# Patient Record
Sex: FEMALE | Race: WHITE | Employment: STUDENT | URBAN - NONMETROPOLITAN AREA
[De-identification: names, ages, dates, MRNs, and addresses within clinical notes are randomized per-mention and may not be internally consistent; named-entity substitution may affect disease eponyms.]

---
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
## 2023-01-18 ENCOUNTER — OFFICE VISIT (OUTPATIENT)
Dept: FAMILY MEDICINE CLINIC | Age: 18
End: 2023-01-18

## 2023-01-18 VITALS
HEART RATE: 118 BPM | RESPIRATION RATE: 18 BRPM | BODY MASS INDEX: 21.37 KG/M2 | DIASTOLIC BLOOD PRESSURE: 78 MMHG | WEIGHT: 141 LBS | SYSTOLIC BLOOD PRESSURE: 118 MMHG | HEIGHT: 68 IN

## 2023-01-18 DIAGNOSIS — R00.2 PALPITATIONS: ICD-10-CM

## 2023-01-18 DIAGNOSIS — Z00.00 WELL ADULT EXAM: Primary | ICD-10-CM

## 2023-01-18 DIAGNOSIS — L65.9 HAIR LOSS: ICD-10-CM

## 2023-01-18 DIAGNOSIS — R00.0 TACHYCARDIA: ICD-10-CM

## 2023-01-18 DIAGNOSIS — R51.9 NEW ONSET OF HEADACHES: ICD-10-CM

## 2023-01-18 DIAGNOSIS — Z83.3 FAMILY HISTORY OF DIABETES MELLITUS: ICD-10-CM

## 2023-01-18 DIAGNOSIS — R53.83 FATIGUE, UNSPECIFIED TYPE: ICD-10-CM

## 2023-01-18 LAB
BASOPHILS # BLD: 0.8 %
BASOPHILS ABSOLUTE: 0.1 THOU/MM3 (ref 0–0.1)
EOSINOPHIL # BLD: 0.8 %
EOSINOPHILS ABSOLUTE: 0.1 THOU/MM3 (ref 0–0.4)
ERYTHROCYTE [DISTWIDTH] IN BLOOD BY AUTOMATED COUNT: 13 % (ref 11.5–14.5)
ERYTHROCYTE [DISTWIDTH] IN BLOOD BY AUTOMATED COUNT: 44.1 FL (ref 35–45)
HCT VFR BLD CALC: 42.1 % (ref 37–47)
HEMOGLOBIN: 14.2 GM/DL (ref 12–16)
IMMATURE GRANS (ABS): 0.02 THOU/MM3 (ref 0–0.07)
IMMATURE GRANULOCYTES: 0.3 %
LYMPHOCYTES # BLD: 34 %
LYMPHOCYTES ABSOLUTE: 2.2 THOU/MM3 (ref 1–4.8)
MCH RBC QN AUTO: 31.5 PG (ref 26–33)
MCHC RBC AUTO-ENTMCNC: 33.7 GM/DL (ref 32.2–35.5)
MCV RBC AUTO: 93.3 FL (ref 81–99)
MONOCYTES # BLD: 8.3 %
MONOCYTES ABSOLUTE: 0.5 THOU/MM3 (ref 0.4–1.3)
NUCLEATED RED BLOOD CELLS: 0 /100 WBC
PLATELET # BLD: 267 THOU/MM3 (ref 130–400)
PMV BLD AUTO: 10.5 FL (ref 9.4–12.4)
RBC # BLD: 4.51 MILL/MM3 (ref 4.2–5.4)
SEG NEUTROPHILS: 55.8 %
SEGMENTED NEUTROPHILS ABSOLUTE COUNT: 3.7 THOU/MM3 (ref 1.8–7.7)
WBC # BLD: 6.6 THOU/MM3 (ref 4.8–10.8)

## 2023-01-18 RX ORDER — LEVONORGESTREL AND ETHINYL ESTRADIOL 0.1-0.02MG
KIT ORAL
COMMUNITY
Start: 2023-01-14

## 2023-01-18 SDOH — ECONOMIC STABILITY: FOOD INSECURITY: WITHIN THE PAST 12 MONTHS, YOU WORRIED THAT YOUR FOOD WOULD RUN OUT BEFORE YOU GOT MONEY TO BUY MORE.: NEVER TRUE

## 2023-01-18 SDOH — ECONOMIC STABILITY: FOOD INSECURITY: WITHIN THE PAST 12 MONTHS, THE FOOD YOU BOUGHT JUST DIDN'T LAST AND YOU DIDN'T HAVE MONEY TO GET MORE.: NEVER TRUE

## 2023-01-18 ASSESSMENT — ENCOUNTER SYMPTOMS
SHORTNESS OF BREATH: 0
COLOR CHANGE: 0
BACK PAIN: 0
ABDOMINAL PAIN: 0
DIARRHEA: 0
BLOOD IN STOOL: 0
COUGH: 0
ROS SKIN COMMENTS: HAIR LOSS
SINUS PRESSURE: 0
EYE ITCHING: 0
CONSTIPATION: 0
EYE DISCHARGE: 0
EYE REDNESS: 0
WHEEZING: 0
EYE PAIN: 0

## 2023-01-18 ASSESSMENT — PATIENT HEALTH QUESTIONNAIRE - PHQ9
SUM OF ALL RESPONSES TO PHQ9 QUESTIONS 1 & 2: 0
1. LITTLE INTEREST OR PLEASURE IN DOING THINGS: 0
SUM OF ALL RESPONSES TO PHQ QUESTIONS 1-9: 0
2. FEELING DOWN, DEPRESSED OR HOPELESS: 0
SUM OF ALL RESPONSES TO PHQ QUESTIONS 1-9: 0

## 2023-01-18 ASSESSMENT — SOCIAL DETERMINANTS OF HEALTH (SDOH): HOW HARD IS IT FOR YOU TO PAY FOR THE VERY BASICS LIKE FOOD, HOUSING, MEDICAL CARE, AND HEATING?: NOT HARD AT ALL

## 2023-01-18 NOTE — PROGRESS NOTES
Brianna Leon (:  2005) is a 21 y.o. female,Established patient, here for evaluation of the following chief complaint(s): Annual Exam (Concerned w hair loss, tachycardia, palpitations )         ASSESSMENT/PLAN:  [facility] was seen today for annual exam.    Diagnoses and all orders for this visit:      Tachycardia  - Chronic  - EKG revealed ST  - No identifiable cause at this time  - Avoid caffeine intake. Adequate hydration encouraged. - Further evaluate with blood work  - Consider cardiology referral or use of beta blocker if indicated. -     EKG 12 Lead  -     Ferritin; Future  -     Iron; Future  -     TSH with Reflex; Future  -     CBC with Auto Differential; Future  -     Basic Metabolic Panel; Future    Hair loss  - New  - Further evaluate with blood work  - Given diffuse nature of hair loss, fungal cause is not suspected  - Consider dermatology referral if indicated  -     Ferritin; Future  -     Iron; Future  -     TSH with Reflex; Future    Fatigue, unspecified type  -     Ferritin; Future  -     Iron; Future  -     TSH with Reflex; Future  -     CBC with Auto Differential; Future  -     Basic Metabolic Panel; Future    New onset of headaches  - New  - Given new presentation and family history of glioblastoma, mri ordered for further evaluation  - Adequate hydration and sleep encouraged  -     MRI BRAIN WO CONTRAST; Future    Palpitations  - Chronic  - EKG revealed ST  - No identifiable cause at this time  - Avoid caffeine intake. Adequate hydration encouraged. - Further evaluate with blood work  - Consider cardiology referral or use of beta blocker if indicated. -     EKG 12 Lead  -     Ferritin; Future  -     Iron; Future  -     TSH with Reflex; Future  -     CBC with Auto Differential; Future  -     Basic Metabolic Panel; Future    Family history of diabetes mellitus  -     Hemoglobin A1C; Future            Return in about 1 month (around 2023) for Headaches, palpitations . Subjective   SUBJECTIVE/OBJECTIVE:  Patient presents with her mother for evaluation of tachycardia, headaches, palpitations and hair loss. Headaches occurring once per week. Has one currently. Bilateral temples. Sharp, pounding. At their worst, pain is a 10/10. Tylenol, ibuprofen do not resolve pain. Typically resolve with sleep. No known triggers. Photosensitivity. Previously experienced headaches with her menstrual cycles but those resolved with oral birth control. Family history of glioblastoma (paternal grandmother). Generalized hair loss. Mom notices significant amount of hair in the patient's hair brush. Palpitations   This is a recurrent problem. The current episode started more than 1 year ago. The problem occurs intermittently. On average, each episode lasts 30 minutes. Nothing aggravates the symptoms. Associated symptoms include chest fullness. Associated symptoms comments: Hard to swallow, lightheadedness. Review of Systems  See additional note    Objective  Physical Exam  See additional note               An electronic signature was used to authenticate this note.     --EMMANUEL Singh - CNP

## 2023-01-18 NOTE — PROGRESS NOTES
[address] [address]  Dept: [phone]  Dept Fax: [phone]: [phone]     Visit Date:  1/18/2023    Patient:  Ferny Cali  YOB: 2005  Age: 21 y.o. Gender: female  BMI: Body mass index is 21.25 kg/m². Ferny Cali, Established patient, is being seen today for   Chief Complaint   Patient presents with    Annual Exam     Concerned w hair loss, tachycardia, palpitations    . Assessment/Plan      1. Well adult exam  - Age-appropriate education provided. - Health maintenance reviewed. - Encourage healthy diet and frequent aerobic exercise. Return in about 1 month (around 2/18/2023) for Headaches, palpitations . HPI:     Patient presents with her mother today for an annual physical examination. Health maintenance reviewed. Going to [facility] for animal studies. Sleeping 8-10 hours daily. Negative medical history. Only current medication is an an oral birth control. PHQ Scores 1/18/2023 3/22/2021 11/12/2019   PHQ2 Score 0 0 0   PHQ9 Score 0 0 0     Interpretation of Total Score Depression Severity: 1-4 = Minimal depression, 5-9 = Mild depression, 10-14 = Moderate depression, 15-19 = Moderately severe depression, 20-27 = Severe depression      Medications    Current Outpatient Medications:     BALCOLTRA 0.1-20 MG-MCG(21) TABS, , Disp: , Rfl:     The patient has No Known Allergies. Past Medical History  Shelbie Silva  has no past medical history on file. Past Surgical History  The patient  has no past surgical history on file. Family History  This patient's family history is not on file. Social History  Shelbie Silva  reports that she has never smoked. She has never used smokeless tobacco. She reports that she does not drink alcohol and does not use drugs. Health Maintenance:    Health maintenance reviewed.    Health Maintenance   Topic Date Due    COVID-19 Vaccine (1) Never done    HPV vaccine (1 - 2-dose series) Never done    HIV screen  Never done    Chlamydia/GC screen  Never done    Hepatitis C screen  Never done    Depression Screen  03/22/2022    Flu vaccine (1) Never done    DTaP/Tdap/Td vaccine (5 - Td or Tdap) 06/30/2025    Hepatitis A vaccine  Completed    Hib vaccine  Completed    Varicella vaccine  Completed    Meningococcal (ACWY) vaccine  Completed    Pneumococcal 0-64 years Vaccine  Aged Out       Subjective/Objective:      Review of Systems   Constitutional:  Positive for fatigue. Negative for chills and fever. HENT:  Negative for congestion, ear pain, sinus pressure and sneezing. Eyes:  Negative for pain, discharge, redness and itching. Respiratory:  Negative for cough, shortness of breath and wheezing. Cardiovascular:  Positive for palpitations. Negative for chest pain and leg swelling. Gastrointestinal:  Negative for abdominal pain, blood in stool, constipation and diarrhea. Endocrine: Negative for polydipsia, polyphagia and polyuria. Genitourinary:  Negative for difficulty urinating and hematuria. Musculoskeletal:  Negative for arthralgias, back pain and neck pain. Skin:  Negative for color change, pallor and rash. Hair loss   Allergic/Immunologic: Negative for environmental allergies and food allergies. Neurological:  Positive for headaches. Negative for dizziness, light-headedness and numbness. Psychiatric/Behavioral:  Negative for agitation, confusion and dysphoric mood. The patient is not nervous/anxious. /78   Pulse (!) 118   Resp 18   Ht 5' 8.3\" (1.735 m)   Wt 141 lb (64 kg)   LMP 01/04/2023 (Exact Date)   BMI 21.25 kg/m²     Physical Exam  Vitals and nursing note reviewed. Constitutional:       General: She is awake. Appearance: Normal appearance. HENT:      Head: Normocephalic and atraumatic.       Right Ear: Hearing and external ear normal.      Left Ear: Hearing and external ear normal.      Nose: Nose normal. No congestion or rhinorrhea. Eyes:      General: Lids are normal.         Right eye: No discharge. Left eye: No discharge. Conjunctiva/sclera: Conjunctivae normal.      Pupils: Pupils are equal, round, and reactive to light. Neck:      Trachea: No tracheal deviation. Cardiovascular:      Rate and Rhythm: Regular rhythm. Tachycardia present. Heart sounds: Normal heart sounds. No murmur heard. Pulmonary:      Effort: Pulmonary effort is normal. No respiratory distress. Breath sounds: No stridor. No wheezing. Musculoskeletal:      Cervical back: Full passive range of motion without pain. Skin:     General: Skin is dry. Coloration: Skin is not jaundiced or pale. Comments: Diffuse hair loss, thinning noted. Neurological:      General: No focal deficit present. Mental Status: She is alert. Mental status is at baseline. Motor: No weakness. Gait: Gait normal.   Psychiatric:         Mood and Affect: Mood and affect normal.         Behavior: Behavior is cooperative. Labs Reviewed 1/18/2023:    No results found for: WBC, HGB, HCT, PLT, CHOL, TRIG, HDL, LDLDIRECT, ALT, AST, NA, K, CL, CREATININE, BUN, CO2, TSH, PSA, INR, GLUF, LABA1C, LABMICR        Patient given educational materials - see patient instructions. Discussed use, benefit, and side effects of prescribed medications. All patient questions answered. Pt voiced understanding. Reviewed health maintenance.        Electronically signed by EMMANUEL Yeh CNP on 1/18/2023 at 3:46 PM EST

## 2023-01-19 ENCOUNTER — TELEPHONE (OUTPATIENT)
Dept: FAMILY MEDICINE CLINIC | Age: 18
End: 2023-01-19

## 2023-01-19 LAB
ANION GAP SERPL CALCULATED.3IONS-SCNC: 12 MEQ/L (ref 8–16)
AVERAGE GLUCOSE: 102 MG/DL (ref 70–126)
BUN BLDV-MCNC: 9 MG/DL (ref 7–22)
CALCIUM SERPL-MCNC: 9.3 MG/DL (ref 8.5–10.5)
CHLORIDE BLD-SCNC: 105 MEQ/L (ref 98–111)
CO2: 24 MEQ/L (ref 23–33)
CREAT SERPL-MCNC: 0.7 MG/DL (ref 0.4–1.2)
FERRITIN: 69 NG/ML (ref 10–291)
GFR SERPL CREATININE-BSD FRML MDRD: > 60 ML/MIN/1.73M2
GLUCOSE BLD-MCNC: 117 MG/DL (ref 70–108)
HBA1C MFR BLD: 5.4 % (ref 4.4–6.4)
IRON: 72 UG/DL (ref 50–170)
POTASSIUM SERPL-SCNC: 4.2 MEQ/L (ref 3.5–5.2)
SODIUM BLD-SCNC: 141 MEQ/L (ref 135–145)
TSH SERPL DL<=0.05 MIU/L-ACNC: 2.26 UIU/ML (ref 0.4–4.2)

## 2023-01-19 NOTE — TELEPHONE ENCOUNTER
----- Message from EMMANUEL Bernal CNP sent at 1/19/2023  8:53 AM EST -----  Blood work is normal. Glucose slightly elevated but d/t non-fasting state. A1c is normal. Will plan to continue with MRI.

## 2023-02-08 ENCOUNTER — HOSPITAL ENCOUNTER (OUTPATIENT)
Dept: MRI IMAGING | Age: 18
Discharge: HOME | End: 2023-02-08
Payer: COMMERCIAL

## 2023-02-08 DIAGNOSIS — R51.9 NEW ONSET OF HEADACHES: ICD-10-CM

## 2023-02-08 PROCEDURE — 70551 MRI BRAIN STEM W/O DYE: CPT

## 2023-02-09 ENCOUNTER — TELEPHONE (OUTPATIENT)
Dept: FAMILY MEDICINE CLINIC | Age: 18
End: 2023-02-09

## 2023-02-09 NOTE — TELEPHONE ENCOUNTER
----- Message from EMMANUEL Pratt CNP sent at 2/9/2023  9:21 AM EST -----  Mri of the brain is normal.

## 2023-03-01 ENCOUNTER — OFFICE VISIT (OUTPATIENT)
Dept: FAMILY MEDICINE CLINIC | Age: 18
End: 2023-03-01
Payer: COMMERCIAL

## 2023-03-01 VITALS
DIASTOLIC BLOOD PRESSURE: 72 MMHG | SYSTOLIC BLOOD PRESSURE: 122 MMHG | WEIGHT: 141 LBS | BODY MASS INDEX: 21.25 KG/M2 | HEART RATE: 89 BPM | OXYGEN SATURATION: 98 %

## 2023-03-01 DIAGNOSIS — G44.201 ACUTE INTRACTABLE TENSION-TYPE HEADACHE: Primary | ICD-10-CM

## 2023-03-01 PROCEDURE — 99214 OFFICE O/P EST MOD 30 MIN: CPT | Performed by: NURSE PRACTITIONER

## 2023-03-01 RX ORDER — SUMATRIPTAN 100 MG/1
100 TABLET, FILM COATED ORAL DAILY PRN
Qty: 9 TABLET | Refills: 2 | Status: SHIPPED | OUTPATIENT
Start: 2023-03-01

## 2023-03-01 RX ORDER — PROPRANOLOL HYDROCHLORIDE 20 MG/1
20 TABLET ORAL 2 TIMES DAILY
Qty: 180 TABLET | Refills: 0 | Status: SHIPPED | OUTPATIENT
Start: 2023-03-01 | End: 2023-05-30

## 2023-03-01 SDOH — ECONOMIC STABILITY: FOOD INSECURITY: WITHIN THE PAST 12 MONTHS, THE FOOD YOU BOUGHT JUST DIDN'T LAST AND YOU DIDN'T HAVE MONEY TO GET MORE.: NEVER TRUE

## 2023-03-01 SDOH — ECONOMIC STABILITY: HOUSING INSECURITY
IN THE LAST 12 MONTHS, WAS THERE A TIME WHEN YOU DID NOT HAVE A STEADY PLACE TO SLEEP OR SLEPT IN A SHELTER (INCLUDING NOW)?: NO

## 2023-03-01 SDOH — ECONOMIC STABILITY: FOOD INSECURITY: WITHIN THE PAST 12 MONTHS, YOU WORRIED THAT YOUR FOOD WOULD RUN OUT BEFORE YOU GOT MONEY TO BUY MORE.: NEVER TRUE

## 2023-03-01 SDOH — ECONOMIC STABILITY: INCOME INSECURITY: HOW HARD IS IT FOR YOU TO PAY FOR THE VERY BASICS LIKE FOOD, HOUSING, MEDICAL CARE, AND HEATING?: NOT HARD AT ALL

## 2023-03-01 NOTE — PROGRESS NOTES
Jd Pickett (:  2005) is a 21 y.o. female,Established patient, here for evaluation of the following chief complaint(s):  Follow-up (HA not better, has not had palpitation since last visit )         ASSESSMENT/PLAN:  1. Acute intractable tension-type headache  - Chronic, uncontrolled  - Start prophylactic treatment with propranolol, abortive treatment with sumatriptan  - May benefit from OCP adjustment per OBGYN given previous improvements in symptoms with contraception initiation  - Encouraged completion of headache diary  -     propranolol (INDERAL) 20 MG tablet; Take 1 tablet by mouth 2 times daily, Disp-180 tablet, R-0Normal  -     SUMAtriptan (IMITREX) 100 MG tablet; Take 1 tablet by mouth daily as needed for Migraine (May repeat dose after 2 hours if migraine continues. Max 2 doses per day), Disp-9 tablet, R-2Normal    Return if symptoms worsen or fail to improve. Subjective   SUBJECTIVE/OBJECTIVE:  Patient presents with his mother for a follow up of headaches and palpitations. Has not had reoccurrence of palpitations since our last visit. Headahces persist. 2-3 weekly. Pain is a 2-3/10. Some more severe headaches as well. Bilateral temples. Sharp, pounding. At their worst, pain is a 10/10. Tylenol, ibuprofen do not resolve pain. Typically resolve with sleep. No known triggers. Photosensitivity. Interested in starting medication to prevent and treat headaches. Review of Systems   Constitutional:  Negative for fever. Eyes:  Positive for photophobia. Cardiovascular:  Negative for chest pain and palpitations. Neurological:  Positive for headaches. Negative for weakness. Objective   Physical Exam  Vitals and nursing note reviewed. Constitutional:       General: She is awake. Appearance: Normal appearance. HENT:      Head: Normocephalic and atraumatic.       Right Ear: Hearing and external ear normal.      Left Ear: Hearing and external ear normal.      Nose: Nose normal. No congestion or rhinorrhea. Eyes:      General: Lids are normal.         Right eye: No discharge. Left eye: No discharge. Conjunctiva/sclera: Conjunctivae normal.   Neck:      Trachea: No tracheal deviation. Cardiovascular:      Rate and Rhythm: Normal rate and regular rhythm. Heart sounds: Normal heart sounds. No murmur heard. Pulmonary:      Effort: Pulmonary effort is normal. No respiratory distress. Breath sounds: No stridor. No wheezing. Musculoskeletal:      Cervical back: Full passive range of motion without pain. Skin:     General: Skin is dry. Coloration: Skin is not jaundiced or pale. Neurological:      General: No focal deficit present. Mental Status: She is alert. Mental status is at baseline. Psychiatric:         Mood and Affect: Mood and affect normal.         Behavior: Behavior is cooperative. An electronic signature was used to authenticate this note.     --EMMANUEL Farrell - CNP

## 2023-03-02 ASSESSMENT — ENCOUNTER SYMPTOMS: PHOTOPHOBIA: 1

## 2023-04-26 ENCOUNTER — TELEPHONE (OUTPATIENT)
Dept: FAMILY MEDICINE CLINIC | Age: 18
End: 2023-04-26

## 2023-04-26 NOTE — TELEPHONE ENCOUNTER
Pt called wanting to know when her last tetanus shot was, notified pt and voiced understanding. She then asked for a copy. I printed the copy and put it at the  in the red folder for her to . She said she will come tomorrow 4.27.23 to .

## 2023-05-28 DIAGNOSIS — G44.201 ACUTE INTRACTABLE TENSION-TYPE HEADACHE: ICD-10-CM

## 2023-05-30 RX ORDER — PROPRANOLOL HYDROCHLORIDE 20 MG/1
20 TABLET ORAL 2 TIMES DAILY
Qty: 180 TABLET | Refills: 2 | Status: SHIPPED | OUTPATIENT
Start: 2023-05-30 | End: 2024-02-24

## 2023-05-31 DIAGNOSIS — G44.201 ACUTE INTRACTABLE TENSION-TYPE HEADACHE: ICD-10-CM

## 2023-05-31 RX ORDER — SUMATRIPTAN 100 MG/1
100 TABLET, FILM COATED ORAL DAILY PRN
Qty: 9 TABLET | Refills: 2 | Status: SHIPPED | OUTPATIENT
Start: 2023-05-31

## 2023-05-31 NOTE — TELEPHONE ENCOUNTER
Prosper Mccullough called requesting a refill on the following medications:  Requested Prescriptions     Pending Prescriptions Disp Refills    SUMAtriptan (IMITREX) 100 MG tablet [Pharmacy Med Name: SUMATRIPTAN SUCC 100 MG TABLET] 9 tablet 2     Sig: TAKE 1 TABLET BY MOUTH DAILY AS NEEDED FOR MIGRAINE (MAY REPEAT DOSE AFTER 2 HOURS IF MIGRAINE CONTINUES.  MAX 2 DOSES PER DAY)       Date of last visit: 3/1/2023  Date of next visit (if applicable):Visit date not found  Date of last refill: 3/1/2023 9 with 2 refills  Pharmacy Name: [facility], [address] [facility]

## 2023-10-25 ENCOUNTER — TELEMEDICINE (OUTPATIENT)
Dept: FAMILY MEDICINE CLINIC | Age: 18
End: 2023-10-25
Payer: COMMERCIAL

## 2023-10-25 DIAGNOSIS — J02.9 ACUTE PHARYNGITIS, UNSPECIFIED ETIOLOGY: Primary | ICD-10-CM

## 2023-10-25 PROCEDURE — 99213 OFFICE O/P EST LOW 20 MIN: CPT | Performed by: NURSE PRACTITIONER

## 2023-10-25 RX ORDER — AMOXICILLIN 500 MG/1
500 TABLET, FILM COATED ORAL 2 TIMES DAILY
Qty: 20 TABLET | Refills: 0 | Status: SHIPPED | OUTPATIENT
Start: 2023-10-25 | End: 2023-11-04

## 2023-10-25 RX ORDER — PROPRANOLOL HYDROCHLORIDE 20 MG/1
20 TABLET ORAL 2 TIMES DAILY
Qty: 180 TABLET | Refills: 1 | Status: SHIPPED | OUTPATIENT
Start: 2023-10-25 | End: 2024-04-22

## 2023-10-25 ASSESSMENT — ENCOUNTER SYMPTOMS
SORE THROAT: 1
VOMITING: 0
SINUS PRESSURE: 0
SWOLLEN GLANDS: 1
NAUSEA: 0

## 2023-10-25 NOTE — PROGRESS NOTES
Marcos Rabago, was evaluated through a synchronous (real-time) audio-video encounter. The patient (or guardian if applicable) is aware that this is a billable service, which includes applicable co-pays. This Virtual Visit was conducted with patient's (and/or legal guardian's) consent. Patient identification was verified, and a caregiver was present when appropriate. The patient was located at Other: [place], [place]  Provider was located at [facility] ([address]): [address] [facility]. [address]  [address] [facility],  [address]      Marcos Rabago (:  2005) is a Established patient, presenting virtually for evaluation of the following:    Assessment & Plan   Below is the assessment and plan developed based on review of pertinent history, physical exam, labs, studies, and medications. 1. Acute pharyngitis, unspecified etiology  - Acute  - Start amoxicillin for symptoms of pharyngitis  - OTC treatments as needed for symptomatic relief  - Unable to perform testing due to patient's location  -     amoxicillin (AMOXIL) 500 MG tablet; Take 1 tablet by mouth 2 times daily for 10 days, Disp-20 tablet, R-0Normal    Return if symptoms worsen or fail to improve. Subjective   Patietn presents virtually for evaluation of a Full set of vital signs was not obtained d/t lack of proper equipment. sore throat. Pharyngitis  This is a new problem. The current episode started yesterday. The problem occurs constantly. The problem has been gradually worsening. Associated symptoms include diaphoresis, headaches, a sore throat and swollen glands. Pertinent negatives include no chills, congestion, fever, nausea, rash or vomiting. The symptoms are aggravated by eating, swallowing and drinking. She has tried NSAIDs and acetaminophen for the symptoms. The treatment provided no relief. Review of Systems   Constitutional:  Positive for diaphoresis. Negative for chills and fever. HENT:  Positive for sore throat.  Negative for congestion,

## 2025-02-24 ENCOUNTER — OFFICE VISIT (OUTPATIENT)
Dept: FAMILY MEDICINE CLINIC | Age: 20
End: 2025-02-24

## 2025-02-24 VITALS
OXYGEN SATURATION: 99 % | RESPIRATION RATE: 16 BRPM | HEART RATE: 92 BPM | SYSTOLIC BLOOD PRESSURE: 118 MMHG | BODY MASS INDEX: 23.21 KG/M2 | DIASTOLIC BLOOD PRESSURE: 72 MMHG | WEIGHT: 154 LBS

## 2025-02-24 DIAGNOSIS — R00.0 TACHYCARDIA: Primary | ICD-10-CM

## 2025-02-24 DIAGNOSIS — R00.2 PALPITATIONS: ICD-10-CM

## 2025-02-24 ASSESSMENT — ENCOUNTER SYMPTOMS
SHORTNESS OF BREATH: 0
CHEST TIGHTNESS: 0

## 2025-02-24 NOTE — PROGRESS NOTES
Taylor Smiley (:  2005) is a 19 y.o. female,Established patient, here for evaluation of the following chief complaint(s):  Tachycardia        Assessment & Plan   1. Tachycardia  - Chronic, uncontrolled  - Further assessment with echo and Holter Monitor  - Low dose propranolol previously ineffective  - Lab work previously WNL  - Referral to cardiology for further evaluation  -     Echo (TTE) complete (PRN contrast/bubble/strain/3D); Future  -     Extended cardiac holter monitor (3 days-14 day); Future  -     External Referral To Cardiology    2. Palpitations  - Chronic, uncontrolled  - Further assessment with echo and Holter Monitor  - Low dose propranolol previously ineffective  - Lab work previously WNL  - Referral to cardiology for further evaluation  -     Echo (TTE) complete (PRN contrast/bubble/strain/3D); Future  -     Extended cardiac holter monitor (3 days-14 day); Future  -     External Referral To Cardiology      Return if symptoms worsen or fail to improve.       Subjective     Patient presents for evaluation of tachycardia on her Apple Watch. Symptoms have worsened since last evaluated. Has noticed increased readings while at rest. Increases into the 120's without a known cause then returns to baseline 80's-90's. Episodes last 2-3 minutes. Occur 2-3x per week. Last occurred on 2/15. Resolve spontaneously. Palpitations. Denies chest pain, sob and diaphoresis during episodes.     Tachycardia  This is a new problem. The current episode started more than 1 month ago. The problem occurs intermittently. The problem has been unchanged. Associated symptoms include headaches. Pertinent negatives include no chest pain or fever.       Review of Systems   Constitutional:  Negative for fever.   Respiratory:  Negative for chest tightness and shortness of breath.    Cardiovascular:  Positive for palpitations. Negative for chest pain and leg swelling.   Neurological:  Positive for headaches.

## 2025-02-24 NOTE — PATIENT INSTRUCTIONS
Cardiovascular Testing  in Outpatient Care [address].  [address]  [place] [id number]  [phone]    [facility]  Heart and Vascular Care  in [facility]  [address] [facility].  [place] [address]th Floor, [address]  [place] [id number]  [phone]    [facility]  [address] [place] [address] [phone]